# Patient Record
Sex: MALE | Race: WHITE
[De-identification: names, ages, dates, MRNs, and addresses within clinical notes are randomized per-mention and may not be internally consistent; named-entity substitution may affect disease eponyms.]

---

## 2017-11-21 NOTE — RAD
TWO VIEWS CHEST

11/21/17

 

HISTORY: 

Dyspnea.

 

PA and lateral views of the chest obtained on 11/21/17.

 

Comparison made to previous exam from 3/9/17. 

 

Two views chest demonstrate dextroscoliosis. Surgical clips seen in the left hilar region. 

 

Areas of patchy densities seen in the right upper lobe compatible with areas of right upper lobe scar
. This is not significantly changed since the previous comparison exam. No evidence of pneumonia seen
. No other significant abnormality seen. 

 

IMPRESSION:  

Fibrobullous changes with areas of scarring in the right upper lobe. No significant acute abnormality
 seen. 

 

POS: [facility]

## 2018-01-18 NOTE — RAD
TWO VIEW CHEST:

 

Comparison: 11-21-17

 

Indication: Dyspnea. 

 

FINDINGS: 

There is stable linear and reticular nodular density at the superolateral right hemithorax. Additiona
l linear densities of the midright lung zone are present with superimposed remote right rib deformiti
es. Post-operative clips at the mediastinum are seen. The cardiomediastinal silhouette is stable. The
re is mild pulmonary hyperinflation.

 

IMPRESSION: 

Stable chest. 

 

POS: [facility]

## 2018-07-05 NOTE — RAD
2 VIEWS CHEST:

 

Date:  07/05/18 

 

COMPARISON:  

01/18/18. 

 

HISTORY:  

Dyspnea. 

 

FINDINGS:

Two views of the chest show normal sized cardiomediastinal silhouette with atherosclerotic calcificat
ions of the aorta. There is stable biapical pleural thickening. Increased interstitial markings are p
resent. There is no evidence of consolidation, mass, or pleural effusion. Degenerative changes and sc
oliotic curvature of the spine are seen. 

 

IMPRESSION: 

No evidence of acute cardiopulmonary disease. 

 

 

POS: [facility]

## 2019-02-14 NOTE — RAD
PA AND LATERAL VIEWS:

 

History: Dyspnea. 

 

 

FINDINGS:  

Comparison made with exam of 7-5-18.  

 

The heart size is normal. The aorta is tortuous. There is scoliosis of the spine. The lungs are well 
expanded with stable chronic changes. No focal areas of consolidation, pneumothorax or pleural effusi
ons are seen. 

 

IMPRESSION: 

Stable exam. No acute process. 

 

POS: [facility]

## 2019-08-06 NOTE — RAD
TWO VIEWS CHEST:

 

DATE: 8/6/2019.

 

PROVIDED CLINICAL HISTORY: 

Dyspnea.

 

FINDINGS: 

Comparison is made with the study dated 2/14/2019.  Cardiac and mediastinal silhouette is unchanged i
n appearance.  Vascular calcification is again noted.  Prominent emphysematous changes are redemonstr
ated.  There is stable pleural parenchymal opacity at the lateral aspect of the right upper lung zone
.  Lungs appear otherwise clear.  No pleural fluid or pneumothorax apparent.

 

IMPRESSION: 

Stable radiographic appearance of the chest.

 

POS: [facility]

## 2022-05-26 ENCOUNTER — HOSPITAL ENCOUNTER (OUTPATIENT)
Dept: HOSPITAL 92 - RAD | Age: 77
Discharge: HOME | End: 2022-05-26
Attending: INTERNAL MEDICINE
Payer: MEDICARE

## 2022-05-26 DIAGNOSIS — I51.7: ICD-10-CM

## 2022-05-26 DIAGNOSIS — M41.9: ICD-10-CM

## 2022-05-26 DIAGNOSIS — J98.4: ICD-10-CM

## 2022-05-26 DIAGNOSIS — R06.00: Primary | ICD-10-CM

## 2022-05-26 PROCEDURE — 71046 X-RAY EXAM CHEST 2 VIEWS: CPT
